# Patient Record
(demographics unavailable — no encounter records)

---

## 2025-07-01 NOTE — HEALTH RISK ASSESSMENT
[Yes] : Yes [2 - 4 times a month (2 pts)] : 2-4 times a month (2 points) [1 or 2 (0 pts)] : 1 or 2 (0 points) [Never (0 pts)] : Never (0 points) [No] : In the past 12 months have you used drugs other than those required for medical reasons? No [Patient reported mammogram was normal] : Patient reported mammogram was normal [Patient reported PAP Smear was normal] : Patient reported PAP Smear was normal [0] : 2) Feeling down, depressed, or hopeless: Not at all (0) [PHQ-2 Negative - No further assessment needed] : PHQ-2 Negative - No further assessment needed [Audit-CScore] : 2 [[id number]] : 0 [MammogramDate] : 08/24 [PapSmearDate] : 07/24 [Never] : Never

## 2025-07-01 NOTE — HISTORY OF PRESENT ILLNESS
[FreeTextEntry1] : 41F w/ no significant PMH is coming in for CPE.  [de-identified] : 41F w/ no significant PMH is coming in for CPE.  Thinks she is hypoglycemic, blurry vision. Tried GCM, saw low glucose.  Mammogram last year at [facility] in 2024  [facility] 6/5/25  Abd pain 1 month, thinks she has also noticed abd distention  Otherwise feels well, no acute complaints.

## 2025-07-01 NOTE — PHYSICAL EXAM
[Normal Sclera/Conjunctiva] : normal sclera/conjunctiva [PERRL] : pupils equal round and reactive to light [EOMI] : extraocular movements intact [No Lymphadenopathy] : no lymphadenopathy [Supple] : supple [No Respiratory Distress] : no respiratory distress  [No Accessory Muscle Use] : no accessory muscle use [Clear to Auscultation] : lungs were clear to auscultation bilaterally [Normal Rate] : normal rate  [Regular Rhythm] : with a regular rhythm [No Edema] : there was no peripheral edema [Soft] : abdomen soft [Non-distended] : non-distended [Normal Supraclavicular Nodes] : no supraclavicular lymphadenopathy [Normal Anterior Cervical Nodes] : no anterior cervical lymphadenopathy [Coordination Grossly Intact] : coordination grossly intact [No Focal Deficits] : no focal deficits [Normal] : affect was normal and insight and judgment were intact [de-identified] : Thyroid enlarged on exam [de-identified] : TTP in all 4 quadrants

## 2025-07-01 NOTE — HISTORY OF PRESENT ILLNESS
[FreeTextEntry1] : 41F w/ no significant PMH is coming in for CPE.  [de-identified] : 41F w/ no significant PMH is coming in for CPE.  Thinks she is hypoglycemic, blurry vision. Tried GCM, saw low glucose.  Mammogram last year at [facility] in 2024  [facility] 6/5/25  Abd pain 1 month, thinks she has also noticed abd distention  Otherwise feels well, no acute complaints.

## 2025-07-01 NOTE — PHYSICAL EXAM
[Normal Sclera/Conjunctiva] : normal sclera/conjunctiva [PERRL] : pupils equal round and reactive to light [EOMI] : extraocular movements intact [No Lymphadenopathy] : no lymphadenopathy [Supple] : supple [No Respiratory Distress] : no respiratory distress  [No Accessory Muscle Use] : no accessory muscle use [Clear to Auscultation] : lungs were clear to auscultation bilaterally [Normal Rate] : normal rate  [Regular Rhythm] : with a regular rhythm [No Edema] : there was no peripheral edema [Soft] : abdomen soft [Non-distended] : non-distended [Normal Supraclavicular Nodes] : no supraclavicular lymphadenopathy [Normal Anterior Cervical Nodes] : no anterior cervical lymphadenopathy [Coordination Grossly Intact] : coordination grossly intact [No Focal Deficits] : no focal deficits [Normal] : affect was normal and insight and judgment were intact [de-identified] : Thyroid enlarged on exam [de-identified] : TTP in all 4 quadrants

## 2025-07-01 NOTE — HEALTH RISK ASSESSMENT
[Yes] : Yes [2 - 4 times a month (2 pts)] : 2-4 times a month (2 points) [1 or 2 (0 pts)] : 1 or 2 (0 points) [Never (0 pts)] : Never (0 points) [No] : In the past 12 months have you used drugs other than those required for medical reasons? No [Patient reported mammogram was normal] : Patient reported mammogram was normal [Patient reported PAP Smear was normal] : Patient reported PAP Smear was normal [0] : 2) Feeling down, depressed, or hopeless: Not at all (0) [PHQ-2 Negative - No further assessment needed] : PHQ-2 Negative - No further assessment needed [Audit-CScore] : 2 [[id number]] : 0 [MammogramDate] : 08/24 [PapSmearDate] : 07/24 [Never] : Never

## 2025-07-01 NOTE — ASSESSMENT
[FreeTextEntry1] : 41F w/ no significant PMH is coming in for CPE.  #Abd pain -Abd pain x 1 month, along w/ distention -Check Abd US  #HCM -Labs as below -Recommended annual skin exam by Derm -Recommended dentist every 6 months -Recommended healthy diet and exercise -C/w GYN for pap smears -Mammogram ordered  RTC depending on results